# Patient Record
Sex: FEMALE | Race: WHITE | NOT HISPANIC OR LATINO | URBAN - METROPOLITAN AREA
[De-identification: names, ages, dates, MRNs, and addresses within clinical notes are randomized per-mention and may not be internally consistent; named-entity substitution may affect disease eponyms.]

---

## 2019-01-21 ENCOUNTER — INPATIENT (INPATIENT)
Facility: HOSPITAL | Age: 29
LOS: 1 days | Discharge: HOME | End: 2019-01-23
Attending: SPECIALIST | Admitting: SPECIALIST

## 2019-01-21 ENCOUNTER — TRANSCRIPTION ENCOUNTER (OUTPATIENT)
Age: 29
End: 2019-01-21

## 2019-01-21 VITALS — HEIGHT: 64 IN | WEIGHT: 149.91 LBS

## 2019-01-21 DIAGNOSIS — O26.899 OTHER SPECIFIED PREGNANCY RELATED CONDITIONS, UNSPECIFIED TRIMESTER: ICD-10-CM

## 2019-01-21 DIAGNOSIS — Z3A.00 WEEKS OF GESTATION OF PREGNANCY NOT SPECIFIED: ICD-10-CM

## 2019-01-21 LAB
BASOPHILS # BLD AUTO: 0.03 K/UL — SIGNIFICANT CHANGE UP (ref 0–0.2)
BASOPHILS NFR BLD AUTO: 0.4 % — SIGNIFICANT CHANGE UP (ref 0–2)
BLD GP AB SCN SERPL QL: NEGATIVE — SIGNIFICANT CHANGE UP
EOSINOPHIL # BLD AUTO: 0.15 K/UL — SIGNIFICANT CHANGE UP (ref 0–0.5)
EOSINOPHIL NFR BLD AUTO: 1.9 % — SIGNIFICANT CHANGE UP (ref 0–6)
HBV SURFACE AG SER-ACNC: NEGATIVE — SIGNIFICANT CHANGE UP
HCT VFR BLD CALC: 32.2 % — LOW (ref 34.5–45)
HGB BLD-MCNC: 10.1 G/DL — LOW (ref 11.5–15.5)
HIV COMBO RESULT: SIGNIFICANT CHANGE UP
HIV1+2 AB SPEC QL: SIGNIFICANT CHANGE UP
IMM GRANULOCYTES NFR BLD AUTO: 1 % — SIGNIFICANT CHANGE UP (ref 0–1.5)
LYMPHOCYTES # BLD AUTO: 2.68 K/UL — SIGNIFICANT CHANGE UP (ref 1–3.3)
LYMPHOCYTES # BLD AUTO: 33.2 % — SIGNIFICANT CHANGE UP (ref 13–44)
MCHC RBC-ENTMCNC: 26.4 PG — LOW (ref 27–34)
MCHC RBC-ENTMCNC: 31.4 % — LOW (ref 32–36)
MCV RBC AUTO: 84.1 FL — SIGNIFICANT CHANGE UP (ref 80–100)
MONOCYTES # BLD AUTO: 0.66 K/UL — SIGNIFICANT CHANGE UP (ref 0–0.9)
MONOCYTES NFR BLD AUTO: 8.2 % — SIGNIFICANT CHANGE UP (ref 2–14)
NEUTROPHILS # BLD AUTO: 4.48 K/UL — SIGNIFICANT CHANGE UP (ref 1.8–7.4)
NEUTROPHILS NFR BLD AUTO: 55.3 % — SIGNIFICANT CHANGE UP (ref 43–77)
NRBC # FLD: 0 K/UL — LOW (ref 25–125)
PLATELET # BLD AUTO: 173 K/UL — SIGNIFICANT CHANGE UP (ref 150–400)
PMV BLD: 10.6 FL — SIGNIFICANT CHANGE UP (ref 7–13)
RBC # BLD: 3.83 M/UL — SIGNIFICANT CHANGE UP (ref 3.8–5.2)
RBC # FLD: 13.6 % — SIGNIFICANT CHANGE UP (ref 10.3–14.5)
RH IG SCN BLD-IMP: POSITIVE — SIGNIFICANT CHANGE UP
T PALLIDUM AB TITR SER: NEGATIVE — SIGNIFICANT CHANGE UP
WBC # BLD: 8.08 K/UL — SIGNIFICANT CHANGE UP (ref 3.8–10.5)
WBC # FLD AUTO: 8.08 K/UL — SIGNIFICANT CHANGE UP (ref 3.8–10.5)

## 2019-01-21 RX ORDER — DOCUSATE SODIUM 100 MG
100 CAPSULE ORAL
Qty: 0 | Refills: 0 | Status: DISCONTINUED | OUTPATIENT
Start: 2019-01-21 | End: 2019-01-21

## 2019-01-21 RX ORDER — MAGNESIUM HYDROXIDE 400 MG/1
30 TABLET, CHEWABLE ORAL
Qty: 0 | Refills: 0 | Status: DISCONTINUED | OUTPATIENT
Start: 2019-01-21 | End: 2019-01-23

## 2019-01-21 RX ORDER — GLYCERIN ADULT
1 SUPPOSITORY, RECTAL RECTAL AT BEDTIME
Qty: 0 | Refills: 0 | Status: DISCONTINUED | OUTPATIENT
Start: 2019-01-21 | End: 2019-01-21

## 2019-01-21 RX ORDER — OXYCODONE HYDROCHLORIDE 5 MG/1
5 TABLET ORAL EVERY 4 HOURS
Qty: 0 | Refills: 0 | Status: DISCONTINUED | OUTPATIENT
Start: 2019-01-21 | End: 2019-01-23

## 2019-01-21 RX ORDER — OXYCODONE HYDROCHLORIDE 5 MG/1
5 TABLET ORAL
Qty: 0 | Refills: 0 | Status: DISCONTINUED | OUTPATIENT
Start: 2019-01-21 | End: 2019-01-21

## 2019-01-21 RX ORDER — OXYCODONE HYDROCHLORIDE 5 MG/1
5 TABLET ORAL
Qty: 0 | Refills: 0 | Status: DISCONTINUED | OUTPATIENT
Start: 2019-01-21 | End: 2019-01-23

## 2019-01-21 RX ORDER — IBUPROFEN 200 MG
600 TABLET ORAL EVERY 6 HOURS
Qty: 0 | Refills: 0 | Status: DISCONTINUED | OUTPATIENT
Start: 2019-01-21 | End: 2019-01-23

## 2019-01-21 RX ORDER — LANOLIN
1 OINTMENT (GRAM) TOPICAL
Qty: 0 | Refills: 0 | Status: DISCONTINUED | OUTPATIENT
Start: 2019-01-21 | End: 2019-01-21

## 2019-01-21 RX ORDER — SODIUM CHLORIDE 9 MG/ML
1000 INJECTION, SOLUTION INTRAVENOUS
Qty: 0 | Refills: 0 | Status: DISCONTINUED | OUTPATIENT
Start: 2019-01-21 | End: 2019-01-21

## 2019-01-21 RX ORDER — OXYTOCIN 10 UNIT/ML
41.67 VIAL (ML) INJECTION
Qty: 20 | Refills: 0 | Status: DISCONTINUED | OUTPATIENT
Start: 2019-01-21 | End: 2019-01-21

## 2019-01-21 RX ORDER — OXYCODONE HYDROCHLORIDE 5 MG/1
5 TABLET ORAL EVERY 4 HOURS
Qty: 0 | Refills: 0 | Status: DISCONTINUED | OUTPATIENT
Start: 2019-01-21 | End: 2019-01-21

## 2019-01-21 RX ORDER — ACETAMINOPHEN 500 MG
975 TABLET ORAL EVERY 6 HOURS
Qty: 0 | Refills: 0 | Status: DISCONTINUED | OUTPATIENT
Start: 2019-01-21 | End: 2019-01-21

## 2019-01-21 RX ORDER — DIPHENHYDRAMINE HCL 50 MG
25 CAPSULE ORAL EVERY 6 HOURS
Qty: 0 | Refills: 0 | Status: DISCONTINUED | OUTPATIENT
Start: 2019-01-21 | End: 2019-01-23

## 2019-01-21 RX ORDER — PRAMOXINE HYDROCHLORIDE 150 MG/15G
1 AEROSOL, FOAM RECTAL EVERY 4 HOURS
Qty: 0 | Refills: 0 | Status: DISCONTINUED | OUTPATIENT
Start: 2019-01-21 | End: 2019-01-23

## 2019-01-21 RX ORDER — DIPHENHYDRAMINE HCL 50 MG
25 CAPSULE ORAL EVERY 6 HOURS
Qty: 0 | Refills: 0 | Status: DISCONTINUED | OUTPATIENT
Start: 2019-01-21 | End: 2019-01-21

## 2019-01-21 RX ORDER — AER TRAVELER 0.5 G/1
1 SOLUTION RECTAL; TOPICAL EVERY 4 HOURS
Qty: 0 | Refills: 0 | Status: DISCONTINUED | OUTPATIENT
Start: 2019-01-21 | End: 2019-01-23

## 2019-01-21 RX ORDER — DIBUCAINE 1 %
1 OINTMENT (GRAM) RECTAL EVERY 4 HOURS
Qty: 0 | Refills: 0 | Status: DISCONTINUED | OUTPATIENT
Start: 2019-01-21 | End: 2019-01-23

## 2019-01-21 RX ORDER — OXYTOCIN 10 UNIT/ML
333.33 VIAL (ML) INJECTION
Qty: 20 | Refills: 0 | Status: DISCONTINUED | OUTPATIENT
Start: 2019-01-21 | End: 2019-01-21

## 2019-01-21 RX ORDER — SODIUM CHLORIDE 9 MG/ML
3 INJECTION INTRAMUSCULAR; INTRAVENOUS; SUBCUTANEOUS EVERY 8 HOURS
Qty: 0 | Refills: 0 | Status: DISCONTINUED | OUTPATIENT
Start: 2019-01-21 | End: 2019-01-23

## 2019-01-21 RX ORDER — TETANUS TOXOID, REDUCED DIPHTHERIA TOXOID AND ACELLULAR PERTUSSIS VACCINE, ADSORBED 5; 2.5; 8; 8; 2.5 [IU]/.5ML; [IU]/.5ML; UG/.5ML; UG/.5ML; UG/.5ML
0.5 SUSPENSION INTRAMUSCULAR ONCE
Qty: 0 | Refills: 0 | Status: DISCONTINUED | OUTPATIENT
Start: 2019-01-21 | End: 2019-01-23

## 2019-01-21 RX ORDER — TETANUS TOXOID, REDUCED DIPHTHERIA TOXOID AND ACELLULAR PERTUSSIS VACCINE, ADSORBED 5; 2.5; 8; 8; 2.5 [IU]/.5ML; [IU]/.5ML; UG/.5ML; UG/.5ML; UG/.5ML
0.5 SUSPENSION INTRAMUSCULAR ONCE
Qty: 0 | Refills: 0 | Status: DISCONTINUED | OUTPATIENT
Start: 2019-01-21 | End: 2019-01-21

## 2019-01-21 RX ORDER — HYDROCORTISONE 1 %
1 OINTMENT (GRAM) TOPICAL EVERY 4 HOURS
Qty: 0 | Refills: 0 | Status: DISCONTINUED | OUTPATIENT
Start: 2019-01-21 | End: 2019-01-23

## 2019-01-21 RX ORDER — OXYTOCIN 10 UNIT/ML
2 VIAL (ML) INJECTION
Qty: 30 | Refills: 0 | Status: DISCONTINUED | OUTPATIENT
Start: 2019-01-21 | End: 2019-01-21

## 2019-01-21 RX ORDER — SIMETHICONE 80 MG/1
80 TABLET, CHEWABLE ORAL EVERY 4 HOURS
Qty: 0 | Refills: 0 | Status: DISCONTINUED | OUTPATIENT
Start: 2019-01-21 | End: 2019-01-21

## 2019-01-21 RX ORDER — OXYTOCIN 10 UNIT/ML
333.33 VIAL (ML) INJECTION
Qty: 20 | Refills: 0 | Status: COMPLETED | OUTPATIENT
Start: 2019-01-21 | End: 2019-01-21

## 2019-01-21 RX ORDER — FERROUS SULFATE 325(65) MG
325 TABLET ORAL DAILY
Qty: 0 | Refills: 0 | Status: DISCONTINUED | OUTPATIENT
Start: 2019-01-21 | End: 2019-01-21

## 2019-01-21 RX ORDER — ACETAMINOPHEN 500 MG
975 TABLET ORAL EVERY 6 HOURS
Qty: 0 | Refills: 0 | Status: DISCONTINUED | OUTPATIENT
Start: 2019-01-21 | End: 2019-01-23

## 2019-01-21 RX ORDER — IBUPROFEN 200 MG
600 TABLET ORAL EVERY 6 HOURS
Qty: 0 | Refills: 0 | Status: DISCONTINUED | OUTPATIENT
Start: 2019-01-21 | End: 2019-01-21

## 2019-01-21 RX ORDER — LANOLIN
1 OINTMENT (GRAM) TOPICAL EVERY 6 HOURS
Qty: 0 | Refills: 0 | Status: DISCONTINUED | OUTPATIENT
Start: 2019-01-21 | End: 2019-01-23

## 2019-01-21 RX ORDER — IBUPROFEN 200 MG
600 TABLET ORAL EVERY 6 HOURS
Qty: 0 | Refills: 0 | Status: COMPLETED | OUTPATIENT
Start: 2019-01-21 | End: 2019-12-20

## 2019-01-21 RX ORDER — SIMETHICONE 80 MG/1
80 TABLET, CHEWABLE ORAL EVERY 6 HOURS
Qty: 0 | Refills: 0 | Status: DISCONTINUED | OUTPATIENT
Start: 2019-01-21 | End: 2019-01-23

## 2019-01-21 RX ORDER — ACETAMINOPHEN 500 MG
975 TABLET ORAL EVERY 6 HOURS
Qty: 0 | Refills: 0 | Status: COMPLETED | OUTPATIENT
Start: 2019-01-21 | End: 2019-12-20

## 2019-01-21 RX ORDER — GLYCERIN ADULT
1 SUPPOSITORY, RECTAL RECTAL AT BEDTIME
Qty: 0 | Refills: 0 | Status: DISCONTINUED | OUTPATIENT
Start: 2019-01-21 | End: 2019-01-23

## 2019-01-21 RX ORDER — DOCUSATE SODIUM 100 MG
100 CAPSULE ORAL
Qty: 0 | Refills: 0 | Status: DISCONTINUED | OUTPATIENT
Start: 2019-01-21 | End: 2019-01-23

## 2019-01-21 RX ADMIN — Medication 975 MILLIGRAM(S): at 17:09

## 2019-01-21 RX ADMIN — Medication 975 MILLIGRAM(S): at 12:24

## 2019-01-21 RX ADMIN — Medication 600 MILLIGRAM(S): at 23:13

## 2019-01-21 RX ADMIN — Medication 600 MILLIGRAM(S): at 11:25

## 2019-01-21 RX ADMIN — Medication 1000 MILLIUNIT(S)/MIN: at 06:56

## 2019-01-21 RX ADMIN — Medication 100 MILLIGRAM(S): at 23:16

## 2019-01-21 RX ADMIN — Medication 975 MILLIGRAM(S): at 11:24

## 2019-01-21 RX ADMIN — Medication 600 MILLIGRAM(S): at 18:09

## 2019-01-21 RX ADMIN — SODIUM CHLORIDE 250 MILLILITER(S): 9 INJECTION, SOLUTION INTRAVENOUS at 03:32

## 2019-01-21 RX ADMIN — Medication 975 MILLIGRAM(S): at 23:13

## 2019-01-21 RX ADMIN — Medication 125 MILLIUNIT(S)/MIN: at 06:57

## 2019-01-21 RX ADMIN — Medication 975 MILLIGRAM(S): at 18:09

## 2019-01-21 RX ADMIN — Medication 600 MILLIGRAM(S): at 17:08

## 2019-01-21 RX ADMIN — Medication 600 MILLIGRAM(S): at 12:24

## 2019-01-21 RX ADMIN — Medication 2 MILLIUNIT(S)/MIN: at 03:32

## 2019-01-21 NOTE — DISCHARGE NOTE OB - CARE PROVIDER_API CALL
Efra Kitchen (MD), Obstetrics and Gynecology  [address] [facility]  [address] [place] [address]  Phone: [phone]  Fax: [phone]

## 2019-01-21 NOTE — DISCHARGE NOTE OB - MEDICATION SUMMARY - MEDICATIONS TO TAKE
I will START or STAY ON the medications listed below when I get home from the hospital:    ibuprofen 600 mg oral tablet  -- 1 tab(s) by mouth every 6 hours  -- Indication: For pain    Prenatabs Rx oral tablet  -- 1 tab(s) by mouth once a day  -- Indication: For vitamins I will START or STAY ON the medications listed below when I get home from the hospital:    ibuprofen 600 mg oral tablet  -- 1 tab(s) by mouth every 6 hours, As Needed  -- Indication: For pain    Prenatabs Rx oral tablet  -- 1 tab(s) by mouth once a day  -- Indication: For vitamins

## 2019-01-21 NOTE — DISCHARGE NOTE OB - PATIENT PORTAL LINK FT
You can access the [facility] Portal, offered by [facility], by registering with the following website: [email or web address]

## 2019-01-21 NOTE — DISCHARGE NOTE OB - MATERIALS PROVIDED
[facility] Hearing Screen Program/Guide to Postpartum Care/Discharge Medication Information for Patients and Families Pocket Guide/Vaccinations/Shaken Baby Prevention Handout/  Immunization Record

## 2019-01-21 NOTE — LACTATION INITIAL EVALUATION - INTERVENTION OUTCOME
Experienced breastfeeding mother declines assistance with positioning at this time. Reviewed feeding on demand, recognizing feeding cues and utilizing feeding log. Safe skin to skin, safe sleep and rooming in discussed.

## 2019-01-22 LAB
RUBV IGG SER-ACNC: 2 INDEX — SIGNIFICANT CHANGE UP
RUBV IGG SER-IMP: POSITIVE — SIGNIFICANT CHANGE UP

## 2019-01-22 RX ORDER — IBUPROFEN 200 MG
1 TABLET ORAL
Qty: 0 | Refills: 0 | COMMUNITY

## 2019-01-22 RX ADMIN — Medication 600 MILLIGRAM(S): at 11:53

## 2019-01-22 RX ADMIN — Medication 600 MILLIGRAM(S): at 05:47

## 2019-01-22 RX ADMIN — Medication 600 MILLIGRAM(S): at 17:37

## 2019-01-22 RX ADMIN — Medication 600 MILLIGRAM(S): at 11:23

## 2019-01-22 RX ADMIN — Medication 975 MILLIGRAM(S): at 05:47

## 2019-01-22 RX ADMIN — Medication 600 MILLIGRAM(S): at 23:17

## 2019-01-22 RX ADMIN — Medication 975 MILLIGRAM(S): at 11:53

## 2019-01-22 RX ADMIN — Medication 975 MILLIGRAM(S): at 17:37

## 2019-01-22 RX ADMIN — Medication 975 MILLIGRAM(S): at 00:00

## 2019-01-22 RX ADMIN — Medication 975 MILLIGRAM(S): at 18:07

## 2019-01-22 RX ADMIN — Medication 975 MILLIGRAM(S): at 23:17

## 2019-01-22 RX ADMIN — Medication 600 MILLIGRAM(S): at 00:00

## 2019-01-22 RX ADMIN — Medication 600 MILLIGRAM(S): at 05:18

## 2019-01-22 RX ADMIN — Medication 600 MILLIGRAM(S): at 18:07

## 2019-01-22 RX ADMIN — Medication 975 MILLIGRAM(S): at 05:18

## 2019-01-22 RX ADMIN — Medication 975 MILLIGRAM(S): at 11:23

## 2019-01-22 NOTE — PROGRESS NOTE ADULT - SUBJECTIVE AND OBJECTIVE BOX
Anesthesia Post-op Note    POD#1 S/P labor epidural    Patient is doing well.  OOBAA. Tolerating clears.  Pain is tolerable.  No residual anesthetic issues or complications noted.

## 2019-01-22 NOTE — PROGRESS NOTE ADULT - SUBJECTIVE AND OBJECTIVE BOX
S: Patient doing well.     Pain controlled.  +OOB.  +void.    Tolerating PO.  Lochia WNL.    O: Vital Signs Last 24 Hrs  T(C): 36.3 (2019 05:44), Max: 36.7 (2019 14:16)  T(F): 97.3 (2019 05:44), Max: 98 (2019 14:16)  HR: 63 (2019 05:44) (63 - 72)  BP: 117/69 (2019 05:44) (115/56 - 119/62)  BP(mean): --  RR: 17 (2019 05:44) (17 - 18)  SpO2: 100% (2019 05:44) (98% - 100%)      Pt without complaints  vital signs stable  Abdomen soft  fundus firm, non tender  extremities non tender  lochia wnl    Patient doing well  Routine post partum care    Labs:                        10.1   8.08  )-----------( 173      ( 2019 01:50 )             32.2       ABO Interpretation: A ( @ 01:12)    Rh Interpretation: Positive ( @ 01:12)    Antibody Screen Negative      A: 28y s/p  doing well.   -Continue routine postpartum care.  -Discharge planned for tomorrow

## 2019-01-22 NOTE — PROGRESS NOTE ADULT - SUBJECTIVE AND OBJECTIVE BOX
S: Patient doing well. Minimal lochia. Pain controlled. breastfeeding    O: Vital Signs Last 24 Hrs  T(C): 36.3 (2019 05:44), Max: 36.7 (2019 14:16)  T(F): 97.3 (2019 05:44), Max: 98 (2019 14:16)  HR: 63 (2019 05:44) (60 - 72)  BP: 117/69 (2019 05:44) (94/53 - 119/62)  BP(mean): --  RR: 17 (2019 05:44) (17 - 19)  SpO2: 100% (2019 05:44) (98% - 100%)    Gen: NAD  Abd: soft, NT, ND, fundus firm below umbilicus  Lochia: moderate  Ext: no tenderness    Labs:                        10.1   8.08  )-----------( 173      ( 2019 01:50 )             32.2       ABO Interpretation: A ( @ 01:12)    Rh Interpretation: Positive ( @ 01:12)    Antibody Screen Negative      A: 28y PPD# 1 s/p  doing well.     Plan: Continue routine postpartum care. Anticipate d/c home in am.

## 2019-01-23 VITALS
TEMPERATURE: 98 F | OXYGEN SATURATION: 100 % | HEART RATE: 54 BPM | RESPIRATION RATE: 16 BRPM | DIASTOLIC BLOOD PRESSURE: 61 MMHG | SYSTOLIC BLOOD PRESSURE: 111 MMHG

## 2019-01-23 RX ADMIN — Medication 975 MILLIGRAM(S): at 00:17

## 2019-01-23 RX ADMIN — Medication 975 MILLIGRAM(S): at 08:43

## 2019-01-23 RX ADMIN — Medication 600 MILLIGRAM(S): at 08:43

## 2019-01-23 RX ADMIN — Medication 600 MILLIGRAM(S): at 00:17

## 2022-05-19 ENCOUNTER — APPOINTMENT (OUTPATIENT)
Dept: OBGYN | Facility: CLINIC | Age: 32
End: 2022-05-19
Payer: COMMERCIAL

## 2022-05-19 ENCOUNTER — ASOB RESULT (OUTPATIENT)
Age: 32
End: 2022-05-19

## 2022-05-19 PROBLEM — Z00.00 ENCOUNTER FOR PREVENTIVE HEALTH EXAMINATION: Status: ACTIVE | Noted: 2022-05-19

## 2022-05-19 PROCEDURE — 76830 TRANSVAGINAL US NON-OB: CPT

## 2023-11-16 ENCOUNTER — OUTPATIENT (OUTPATIENT)
Dept: OUTPATIENT SERVICES | Facility: HOSPITAL | Age: 33
LOS: 1 days | Discharge: HOME | End: 2023-11-16

## 2023-11-16 DIAGNOSIS — D64.9 ANEMIA, UNSPECIFIED: ICD-10-CM

## 2023-11-17 ENCOUNTER — APPOINTMENT (OUTPATIENT)
Dept: HEMATOLOGY ONCOLOGY | Facility: CLINIC | Age: 33
End: 2023-11-17

## 2023-12-27 ENCOUNTER — NON-APPOINTMENT (OUTPATIENT)
Age: 33
End: 2023-12-27

## 2023-12-27 NOTE — DISCUSSION/SUMMARY
[FreeTextEntry1] : REASON FOR CONSULT Marianna Boo is a 33-year-old female who was contacted via telephone on 12/27/2023 for a discussion regarding her genetic testing results related to hereditary cancer predisposition.   Ms. B[facility] was [facility] on 11/17/[facility]23 for hereditary cancer cascade genetic testing. Ms. Boo decided to pursue genetic testing using [facility]'s Family Follow Up Testing Program for the familial CHEK2 mutation previously identified in her sister.  TEST RESULTS:   CHEK2 POSITIVE (c.1283C>T; p.S428F).  No pathogenic (disease-causing) variants or additional VUSs were detected in the following genes: CHEK2.  RESULTS INTERPRETATION AND ASSESSMENT: We reviewed with Ms. 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 Please note, missense variants in CHEK2 such as this one are classified as low penetrant and likely do not confer the same level of cancer risks as protein truncating pathogenic variants in the CHEK2 gene.    We discussed that while the CHEK2 mutation likely played a role in the development of her sister's breast cancer, it does not fully explain the breast cancer.    As part of our discussion, we reviewed the cancer risks associated with the CHEK2 (c.1283C>T; p.S428F) mutation:   BREAST: The CHEK2 c.1283C>T (p.S428F) variant is associated with a modest increase in female breast cancer risk (approximately 1.6-fold increased risk). Thus, the cumulative risk of female breast cancer through age 70 associated with this CHEK2 variant is likely near 13% compared to the general population risk of 8%.    COLON: The CHEK2 c.1283C>T (p.S428F) variant is associated with a modest increase in colorectal cancer risk (approximately 1.5-1.6-fold increased risk). Thus, the cumulative lifetime risk of colorectal cancer in unscreened individuals is approximately 6% for women and 7-8% for men compared to the general population risk of approximately 4.2-4.8% through age 85.   OTHER CANCER RISKS:  Studies have described a possible increased risk for a wide range of other cancers associated with CHEK2 mutations. However, these studies are not conclusive, and incremental screening or risk-reduction for these possible associations is not recommended at this time.    We also discussed that it is possible, although unlikely, the patient has a mutation in one of the genes tested that is not detectable by this analysis, or has a mutation in a different gene, either known or unknown. It is also possible there is a different hereditary cancer predisposition in the family, but the patient did not inherit it.  IMPLICATIONS FOR THE PATIENT: Given Ms. Boo's current reported family history of cancer, and her CHEK2 positive genetic test results, the following screening guidelines and risk-reducing recommendations were discussed:  BREAST:  - Given the family history of breast cancer, Ms. Boo's future risk of breast cancer was estimated using an BEN risk evaluation, v8 utilizing family history and reproductive factors. An estimated 1.2-1.4% 10-year risk and 18.0-20.7% remaining lifetime risk of breast cancer was calculated compared to the general population risk of 0.8% and 11.1%, respectively. As per National Comprehensive Cancer Network (NCCN) guidelines, women with a remaining lifetime breast cancer risk estimated as >20% as calculated by models such as BEN could consider increased-risk breast screening. This includes annual mammograms and annual breast MRIs starting at age 40 (or 10 years prior to the youngest age at breast cancer diagnosis in the family, which would be age 28 for Ms. Boo). We also recommended clinical breast exam, every 6-12 months. We additionally emphasized the importance of breast self-awareness.   - We referred Ms. Boo to the Breast Wellness Program for a discussion regarding increased-risk breast screening given her BEN estimates and the low penetrance CHEK2 mutation.   COLON:  - As per current NCCN guidelines, individuals with pathogenic CHEK2 mutations should undergo colonoscopy every 5 years beginning at age 40. However, given the low-penetrance status of this missense mutation, it may be reasonable to consider prolonging the interval between colonoscopy to every 5-10 years at the discretion of the patient's treating gastroenterologist.  - Ms. Boo already undergoes colonoscopy screening due to her history of Crohn's disease. She should continue management at the discretion of her gastroenterologist.   OTHER:  - In the absence of other indications, Ms. Boo should practice age-appropriate cancer screening of other organ systems as recommended for the general population.  IMPLICATIONS FOR FAMILY MEMBERS: This mutation is inherited in an autosomal dominant pattern. We recommend the patient's first-degree relatives, specifically her siblings and parents, pursue genetic counseling and genetic testing as there is a 50% chance they also have the same mutation. Ms. Boo was made aware that if any at-risk relatives wanted to pursue genetic testing any time in the future, we would be happy to see them and coordinate testing. If they are not local, they can locate a genetic counselor using the National Society of Genetic Counselors, Find a Genetic Counselor Tool (www.nsgc.org/findageneticcounselor). The risk of passing on this mutation to a future generation is 50%. Please note, we do not generally recommend genetic testing for children until they are over the age of 18.  RESOURCES & SUPPORT GROUPS  Facing Our Risk of Cancer Empowered (FORCE): www.FacingOurRisk.org    [facility] [place]: www.BrightPink.org   The Breasties: https://thebreasties.org/  TOUCH - The [facility] Breast Cancer [place] https://touchbbca.org/   [employer]: https://latinasisterssupport.org/  National LGBT+ Cancer Network: https://cancer-network.org/    In addition, the oncology social workers at [facility] are available to assist with more referrals, if necessary.   PLAN: 1.	See above note for recommended management. 2.	We encouraged sharing these results with family members. They have a risk to have inherited the same mutation. Other family may benefit from genetic testing and should contact a certified genetic counselor specializing in cancer. Due to HIPAA and [place] laws, Genetics is unable to directly contact other family at risk, but we are available should family members wish to reach out to us. 3.	Family support resources and referrals were provided.  4.	Patient informed consult note(s) will be available through their [facility] patient portal. 5.	Genetic knowledge changes rapidly. We encouraged re-contacting [facility] every 2-3 years for any changes in screening recommendations or sooner if there are significant changes in personal or family history.  For any additional questions please call [facility] at [phone].    Sierra Armando MS, [facility] Counselor, [facility]

## 2024-12-19 NOTE — DISCHARGE NOTE OB - PROVIDER RX CONTACT NUMBER
Patient aware Rx will be routed to Dr. Ibarra for approval. Patient will check with his pharmacy later today to see if Rx is ready.    [phone]